# Patient Record
Sex: FEMALE | Race: WHITE | NOT HISPANIC OR LATINO | Employment: FULL TIME | URBAN - METROPOLITAN AREA
[De-identification: names, ages, dates, MRNs, and addresses within clinical notes are randomized per-mention and may not be internally consistent; named-entity substitution may affect disease eponyms.]

---

## 2021-01-29 ENCOUNTER — TRANSFERRED RECORDS (OUTPATIENT)
Dept: HEALTH INFORMATION MANAGEMENT | Facility: CLINIC | Age: 26
End: 2021-01-29

## 2021-02-11 ENCOUNTER — MEDICAL CORRESPONDENCE (OUTPATIENT)
Dept: HEALTH INFORMATION MANAGEMENT | Facility: CLINIC | Age: 26
End: 2021-02-11

## 2021-02-11 ENCOUNTER — TRANSCRIBE ORDERS (OUTPATIENT)
Dept: OTHER | Age: 26
End: 2021-02-11

## 2021-02-11 ENCOUNTER — TRANSFERRED RECORDS (OUTPATIENT)
Dept: HEALTH INFORMATION MANAGEMENT | Facility: CLINIC | Age: 26
End: 2021-02-11

## 2021-02-11 DIAGNOSIS — D68.59 LOW PROTEIN C ACTIVITY LEVEL (H): Primary | ICD-10-CM

## 2021-02-11 DIAGNOSIS — I26.99 PULMONARY EMBOLISM (H): ICD-10-CM

## 2021-03-07 ENCOUNTER — HEALTH MAINTENANCE LETTER (OUTPATIENT)
Age: 26
End: 2021-03-07

## 2021-04-07 ENCOUNTER — VIRTUAL VISIT (OUTPATIENT)
Dept: HEMATOLOGY | Facility: CLINIC | Age: 26
End: 2021-04-07
Attending: INTERNAL MEDICINE
Payer: COMMERCIAL

## 2021-04-07 VITALS — WEIGHT: 120 LBS

## 2021-04-07 DIAGNOSIS — E61.1 IRON DEFICIENCY: ICD-10-CM

## 2021-04-07 DIAGNOSIS — D68.59 PROTEIN C DEFICIENCY (H): Primary | ICD-10-CM

## 2021-04-07 PROBLEM — D50.8 IRON DEFICIENCY ANEMIA SECONDARY TO INADEQUATE DIETARY IRON INTAKE: Status: ACTIVE | Noted: 2020-12-29

## 2021-04-07 PROBLEM — Z86.711 HISTORY OF PULMONARY EMBOLUS (PE): Status: ACTIVE | Noted: 2020-12-30

## 2021-04-07 PROBLEM — I26.99 PE (PULMONARY THROMBOEMBOLISM) (H): Status: ACTIVE | Noted: 2020-11-28

## 2021-04-07 PROBLEM — Z30.430 ENCOUNTER FOR IUD INSERTION: Status: ACTIVE | Noted: 2021-01-06

## 2021-04-07 PROCEDURE — 99205 OFFICE O/P NEW HI 60 MIN: CPT | Mod: 95 | Performed by: INTERNAL MEDICINE

## 2021-04-07 RX ORDER — FERROUS SULFATE 325(65) MG
325 TABLET ORAL
COMMUNITY

## 2021-04-07 NOTE — PROGRESS NOTES
[facility]  [facility] [place] [address]  Phone: [phone], Fax: [phone]    Outpatient Visit Note:    Patient: Ada Shelby  MRN: [id number]  : 1995  USAMA: 2021    Reason for Consultation:  Provoked PE with probable protein C deficiency    Ada is a 26 year old who is being evaluated via a billable video visit.      How would you like to obtain your AVS? MyChart  If the video visit is dropped, the invitation should be resent by: Send to e-mail at: [email or web address]  Will anyone else be joining your video visit? No      Video Start Time: 10:46 AM    Video-Visit Details    Type of service:  Video Visit    Video End Time:11:34 AM    Originating Location (pt. Location): Home    Distant Location (provider location):  [facility]     Platform used for Video Visit: [email or web address]      Assessment:  In summary, Ada Shelby is a 26 year old woman with past medical history of iron deficiency anemia who presents in consultation due to recent provoked PE.     1. Provoked Pulmonary embolism. ddx 2020. Risk factor(s) 1). Estrogen containing birth control use 2). Probable protein C deficiency (level 66%)  Treatment: started on heparin and transferred to tertiary care. Cardiac U/S showed dilated R ventricle with septal bowing, EKG with S1Q3T3 pattern, R BBB, and TWI in inferior leads. Bilateral DVT U/S was negative. TPA was administered and she was admitted to the MICU on a heparin drip    2. Probable protein C deficiency. Value 66% (normal %) in 2021  3. Elevated Ddimer on anticoagulation: value 630 in 2021 on blood thinner  4. Non-anemia iron deficiency: ferritin 78%    Ada had a provoked thrombosis in setting of being on estrogen, however agree with collegues at [facility] that time (>1.5 y after starting) and severity of her episode warranted additional workup. Her evalutaion is  notable for negative antiphospholipid antibodies, but low protein C of 66% that was obtained in January 2021. This is well removed from her acute thrombosis. However, she was still on Xarelto. Xarelto is not likely to interfere with protein C, but would like to reassess off anticoagulation x1 before giving her this diagnosis. Would also like consult with Gloria Barrios in genetics.     Today, I also discussed with Ada that in female patients, a normal D-dimer off anticoagulation had a 5.4% (CI, 2.5% to 10.2%) risk of recurrent event (Abdifatah KWAN et al. 2015. Brianna Intern Med.162(1):27-3). I suspect her value may still be elevated as while on Xarelto she had level that was 630. However IF upon testing her protein C is normal and her D-dimer is normal she could come off blood thinners.     However, if protein C remains low AND she continues to have elevated D-dimer, I would recommend continuing blood thinner. In E[facility]EIN CHOICE, low dose rivaroxaban (10 mg once daily) was compared to aspirin 100 and to Rivaroxaban usual 20 mg dose. The efficacy was similar between usual and low dose Rivaroxaban, in both cases better than for aspirin. The risk of major bleeding was 0.4% in reduced; which was not significantly different to 0.3% in aspirin. Moreover, the bleeding risk of 20mg was only 0.5%. Ada is currently paying $500 out of pocket for Xarelto--- will address with our pharmacy to determine if payment programs or change to other medication may reduce her cost.     I did address with Ada that in the future, her personal history of blood clot and potential protein C deficiency will have implications for pregnancy and pregnancy planning. Would anticipate that she will need prophylaxis dose LMWH in the peripartum period and full dose anticoagulation post-partum. Would also likely need at least 1 maternal fetal medicine consult.     Last, Ada is ~950 mg deficient in iron (Ganzoni calculation). She is taking oral iron 1x times  a day. Recently, it was found that taking oral iron in divided daily doses increases serum hepcidin which suppresses oral iron absorption. With this in mind, iron-depleted woman with iron supplements daily as divided doses may not increase iron absorption (Dl POTTER et al. 2017. Lancet Haemat). Therefore, I would recommend dosing oral iron in a single dose every other day to optimize absorption. Furthermore, giving the extent of her iron deficiency and symptoms, the patient would benefit from proceeding to parenteral iron preparations. Next, I discussed use of ferric carboxymaltose (Injectafer) and ferumoxytol (Ferraheme) injections. These preparations allow for 500-750 mg of iron to be adminsitered over 10-15 m. The risk of anaphylaxis is decreasaed, so no need for premedications. In order to replete total body iron, patient would need to come to infusion center for 2 visits.      Plan:  1. Majority of today's visit was spent counseling the patient regarding provoked PE and potential Protein C Deficiency.  2. Patient to hold anticoagulation for 48 h then we will repeat test  3. If protein C still low and D-dimer elevated would recommend Xarelto 10 mg PO daily  4. Genetic counseling referral (Gloria Barrios)  5. Repeat iron studies  6. Consider IV iron in ferritin <50 : can be done while patient is on summer break from job    The patient is given our center's contact information and is instructed to call if she should have any further questions or concerns.  Otherwise, we will plan on seeing her back Follow up with Provider - 12 months or sooner .      Trupti Menendez, nurse clinician is present throughout the entire clinic visit with the patient today.  Patient understands and agrees with the above plan and recommendation.    Faiza Regan MD/PhD   of Medicine  Division of Hematology, Oncology and Transplantation    10:45  AM    ---------------------------------------------------------------------------------------------------------------------    History of Present Illness:  Ada Shelby is a 26 year old woman referred for recent pulmonary embolism.     Ada noticed ~2-2.5 weeks prior to hospital she noted decreased endurance and ability to breath while exercising decreased and the number of miles she could run decreased. Then started to noted increased SOB with going up stairs or talking long periods of time. Thought she had COVID. But continued to feel worse with headaches and more SOB while waiting for COVID test and then went to ER. [place] [facility] [place] for tPA given acute hypoxic respiratory failure and R heart strain. Cardiac U/S showed dilated R ventricle with septal bowing, EKG with S1Q3T3 pattern, R BBB, and TWI in inferior leads. Bilateral DVT U/S was negative. TPA was administered and she was admitted to the MICU on a heparin drip, there she was hemodynamically stable and transitioned to RA. She remained hemodynamically stable and was transitioned to rivaroxaban.. Denied any chest pain or SOB at rest, mild tachycardia on exertion. TTE on  showed EF of 60% without evidence of right heart strain and was otherwise unremarkable.     Ada is an avid runner- does a little bit of racing. In the month prior she was not doing any travel. Did take a longer car trip in August.  Started birth control pills in 2018. Stopped taking when she had blood clot. Got an IUD in January.     Ada did see a hematologist 4-5 years ago because of anemia and  . She started taking iron supplement    At present she is not having any issues with endurance. She did have one episode of rib pain in March and repeat testing. No leg swelling.     Past Medical History:  Active Ambulatory Problems     Diagnosis Date Noted     Iron deficiency 2021     Encounter for IUD insertion 2021     History of  pulmonary embolus (PE) 2020     Iron deficiency anemia secondary to inadequate dietary iron intake 2020     PE (pulmonary thromboembolism) (H) 2020     Resolved Ambulatory Problems     Diagnosis Date Noted     No Resolved Ambulatory Problems     Past Medical History:   Diagnosis Date     History of amenorrhea      Iron deficiency anemia      Past Surgical History:  No past surgical history on file.    Medications:  Current Outpatient Medications   Medication Sig Dispense Refill     ferrous sulfate (FEROSUL) 325 (65 Fe) MG tablet Take 325 mg by mouth       levonorgestrel (RITESH) 13.5 MG IUD 1 Intra Uterine Device by Intrauterine route       rivaroxaban ANTICOAGULANT (XARELTO ANTICOAGULANT) 20 MG TABS tablet Take 20 mg by mouth daily with food       Rivaroxaban ANTICOAGULANT 15 & 20 MG TBPK Take 15 mg by mouth twice daily with food for first 21 days, then on day 22 start taking 20 mg daily with food until told to stop by your provider.          Allergies:  No Known Allergies    ROS:  Denies any bleeding issues. No gum bleeding, No nose bleed. Denies any recent  hematuria or blood in stools. Denies any ecchymosis. Denies any lower extremities swelling or pain. Denies any fever, no chest pain. Denies any shortness of breath. Remainder of 14 point ROS is negative unless noted above    Social History:  No tobacco use. rare alcohol use. Denies any illicit drug use. Patient works as a  in [place], [place]. Grew in [place], [place]. Attended [place], [place].  and lives in [place], [place] with . Just got a kitten.     Family History:  1 younger sister  Mother- alive- had surgery for fibromuscular dysplasia 15 years ago. No history of miscarriage  Maternal grandfather  from suicide  Maternal grandmother: alive. In good health  Father- alive. No history of surgeries  Paternal aunt- unaware of miscarriages  Paternal uncle-  Paternal grandfather- . Passed away in hospital from  complications of pneumonia- potential cancer in his 80s  Paternal grandmother- alive    Objectives:  Wt 54.4 kg (120 lb)     GENERAL: Healthy, alert and no distress  EYES: Eyes grossly normal to inspection.  No discharge or erythema, or obvious scleral/conjunctival abnormalities.  RESP: No audible wheeze, cough, or visible cyanosis.  No visible retractions or increased work of breathing.    SKIN: Visible skin clear. No significant rash, abnormal pigmentation or lesions.  NEURO: Cranial nerves grossly intact.  Mentation and speech appropriate for age.  PSYCH: Mentation appears normal, affect normal/bright, judgement and insight intact, normal speech and appearance well-groomed.      Labs:  Reviewed in CareEverywhere  Negative genetic evaluation for factor V leiden, prothrombin mutaiton  Normal anticardiolipin and beta2 glycoprotein x2  Negative lupus anticoagulant x1  Normal AT3, protein S  Protein C 66%      Imaging:  Reviewed reports in CareEverywhere  Most recent CTA negative for residual thrombus

## 2021-04-07 NOTE — LETTER
2021      RE: Ada Shelby  [address] [address]       [facility]  [facility] [place] [address]  Phone: [phone], Fax: [phone]    Outpatient Visit Note:    Patient: Ada Shelby  MRN: [id number]  : 1995  USAMA: 2021    Reason for Consultation:  Provoked PE with probable protein C deficiency    Assessment:  In summary, Ada Shelby is a 26 year old woman with past medical history of iron deficiency anemia who presents in consultation due to recent provoked PE.     1. Provoked Pulmonary embolism. ddx 2020. Risk factor(s) 1). Estrogen containing birth control use 2). Probable protein C deficiency (level 66%)  Treatment: started on heparin and transferred to tertiary care. Cardiac U/S showed dilated R ventricle with septal bowing, EKG with S1Q3T3 pattern, R BBB, and TWI in inferior leads. Bilateral DVT U/S was negative. TPA was administered and she was admitted to the MICU on a heparin drip    2. Probable protein C deficiency. Value 66% (normal %) in 2021  3. Elevated Ddimer on anticoagulation: value 630 in 2021 on blood thinner  4. Non-anemia iron deficiency: ferritin 78%    Ada had a provoked thrombosis in setting of being on estrogen, however agree with collegues at [facility] that time (>1.5 y after starting) and severity of her episode warranted additional workup. Her evalutaion is notable for negative antiphospholipid antibodies, but low protein C of 66% that was obtained in 2021. This is well removed from her acute thrombosis. However, she was still on Xarelto. Xarelto is not likely to interfere with protein C, but would like to reassess off anticoagulation x1 before giving her this diagnosis. Would also like consult with Gloria Barrios in genetics.     Today, I also discussed with Ada that in female patients, a normal D-dimer off anticoagulation had a 5.4% (CI, 2.5% to 10.2%) risk of  recurrent event (Abdifatah KWAN et al. 2015. Brianna Intern Med.162(1):27-3). I suspect her value may still be elevated as while on Xarelto she had level that was 630. However IF upon testing her protein C is normal and her D-dimer is normal she could come off blood thinners.     However, if protein C remains low AND she continues to have elevated D-dimer, I would recommend continuing blood thinner. In EINSTEIN CHOICE, low dose rivaroxaban (10 mg once daily) was compared to aspirin 100 and to Rivaroxaban usual 20 mg dose. The efficacy was similar between usual and low dose Rivaroxaban, in both cases better than for aspirin. The risk of major bleeding was 0.4% in reduced; which was not significantly different to 0.3% in aspirin. Moreover, the bleeding risk of 20mg was only 0.5%. Ada is currently paying $500 out of pocket for Xarelto--- will address with our pharmacy to determine if payment programs or change to other medication may reduce her cost.     I did address with Ada that in the future, her personal history of blood clot and potential protein C deficiency will have implications for pregnancy and pregnancy planning. Would anticipate that she will need prophylaxis dose LMWH in the peripartum period and full dose anticoagulation post-partum. Would also likely need at least 1 maternal fetal medicine consult.     Last, Ada is ~950 mg deficient in iron (Ganzoni calculation). She is taking oral iron 1x times a day. Recently, it was found that taking oral iron in divided daily doses increases serum hepcidin which suppresses oral iron absorption. With this in mind, iron-depleted woman with iron supplements daily as divided doses may not increase iron absorption (Dl POTTER et al. 2017. Lancet Haemat). Therefore, I would recommend dosing oral iron in a single dose every other day to optimize absorption. Furthermore, giving the extent of her iron deficiency and symptoms, the patient would benefit from proceeding to  parenteral iron preparations. Next, I discussed use of ferric carboxymaltose (Injectafer) and ferumoxytol (Ferraheme) injections. These preparations allow for 500-750 mg of iron to be adminsitered over 10-15 m. The risk of anaphylaxis is decreasaed, so no need for premedications. In order to replete total body iron, patient would need to come to infusion center for 2 visits.      Plan:  1. Majority of today's visit was spent counseling the patient regarding provoked PE and potential Protein C Deficiency.  2. Patient to hold anticoagulation for 48 h then we will repeat test  3. If protein C still low and D-dimer elevated would recommend Xarelto 10 mg PO daily  4. Genetic counseling referral (Gloria Barrios)  5. Repeat iron studies  6. Consider IV iron in ferritin <50 : can be done while patient is on summer break from job    The patient is given our center's contact information and is instructed to call if she should have any further questions or concerns.  Otherwise, we will plan on seeing her back Follow up with Provider - 12 months or sooner .      Trupti Menendez, nurse clinician is present throughout the entire clinic visit with the patient today.  Patient understands and agrees with the above plan and recommendation.    Faiza Regan MD/PhD   of Medicine  Division of Hematology, Oncology and Transplantation    10:45 AM    ---------------------------------------------------------------------------------------------------------------------    History of Present Illness:  Ada Shelby is a 26 year old woman referred for recent pulmonary embolism.     Ada noticed ~2-2.5 weeks prior to hospital she noted decreased endurance and ability to breath while exercising decreased and the number of miles she could run decreased. Then started to noted increased SOB with going up stairs or talking long periods of time. Thought she had COVID. But continued to feel worse with headaches and more SOB while  waiting for COVID test and then went to ER. She was transferred to [facility] [place] a heparin drip for tPA given acute hypoxic respiratory failure and R heart strain. Cardiac U/S showed dilated R ventricle with septal bowing, EKG with S1Q3T3 pattern, R BBB, and TWI in inferior leads. Bilateral DVT U/S was negative. TPA was administered and she was admitted to the MICU on a heparin drip, there she was hemodynamically stable and transitioned to RA. She remained hemodynamically stable and was transitioned to rivaroxaban.. Denied any chest pain or SOB at rest, mild tachycardia on exertion. TTE on  showed EF of 60% without evidence of right heart strain and was otherwise unremarkable.     Ada is an avid runner- does a little bit of racing. In the month prior she was not doing any travel. Did take a longer car trip in August.  Started birth control pills in 2018. Stopped taking when she had blood clot. Got an IUD in January.     Ada did see a hematologist 4-5 years ago because of anemia and  . She started taking iron supplement    At present she is not having any issues with endurance. She did have one episode of rib pain in March and repeat testing. No leg swelling.     Past Medical History:  Active Ambulatory Problems     Diagnosis Date Noted     Iron deficiency 2021     Encounter for IUD insertion 2021     History of pulmonary embolus (PE) 2020     Iron deficiency anemia secondary to inadequate dietary iron intake 2020     PE (pulmonary thromboembolism) (H) 2020     Resolved Ambulatory Problems     Diagnosis Date Noted     No Resolved Ambulatory Problems     Past Medical History:   Diagnosis Date     History of amenorrhea      Iron deficiency anemia      Past Surgical History:  No past surgical history on file.    Medications:  Current Outpatient Medications   Medication Sig Dispense Refill     ferrous sulfate (FEROSUL) 325 (65 Fe) MG tablet Take 325 mg by mouth        levonorgestrel (RITESH) 13.5 MG IUD 1 Intra Uterine Device by Intrauterine route       rivaroxaban ANTICOAGULANT (XARELTO ANTICOAGULANT) 20 MG TABS tablet Take 20 mg by mouth daily with food       Rivaroxaban ANTICOAGULANT 15 & 20 MG TBPK Take 15 mg by mouth twice daily with food for first 21 days, then on day 22 start taking 20 mg daily with food until told to stop by your provider.          Allergies:  No Known Allergies    ROS:  Denies any bleeding issues. No gum bleeding, No nose bleed. Denies any recent  hematuria or blood in stools. Denies any ecchymosis. Denies any lower extremities swelling or pain. Denies any fever, no chest pain. Denies any shortness of breath. Remainder of 14 point ROS is negative unless noted above    Social History:  No tobacco use. rare alcohol use. Denies any illicit drug use. Patient works as a  in [place], [place]. Grew in [place], [place]. Attended [place], [place].  and lives in [place], [place] with . Just got a kitten.     Family History:  1 younger sister  Mother- alive- had surgery for fibromuscular dysplasia 15 years ago. No history of miscarriage  Maternal grandfather  from suicide  Maternal grandmother: alive. In good health  Father- alive. No history of surgeries  Paternal aunt- unaware of miscarriages  Paternal uncle-  Paternal grandfather- . Passed away in hospital from complications of pneumonia- potential cancer in his 80s  Paternal grandmother- alive    Objectives:  Wt 54.4 kg (120 lb)     GENERAL: Healthy, alert and no distress  EYES: Eyes grossly normal to inspection.  No discharge or erythema, or obvious scleral/conjunctival abnormalities.  RESP: No audible wheeze, cough, or visible cyanosis.  No visible retractions or increased work of breathing.    SKIN: Visible skin clear. No significant rash, abnormal pigmentation or lesions.  NEURO: Cranial nerves grossly intact.  Mentation and speech appropriate for age.  PSYCH: Mentation appears  normal, affect normal/bright, judgement and insight intact, normal speech and appearance well-groomed.      Labs:  Reviewed in CareEverywhere  Negative genetic evaluation for factor V leiden, prothrombin mutaiton  Normal anticardiolipin and beta2 glycoprotein x2  Negative lupus anticoagulant x1  Normal AT3, protein S  Protein C 66%      Imaging:  Reviewed reports in CareEverywhere  Most recent CTA negative for residual thrombus        Faiza Regan MD

## 2021-04-07 NOTE — PATIENT INSTRUCTIONS
[facility]  [facility]  [facility], [place] [address]  Main: [phone], Fax: [phone]    It was a pleasure seeing you today.  Thank you for allowing us to be involved in your care.  Please let us know if there is anything else we can do for you, so that we can be sure you are leaving completely satisfied with your care experience.    Labs at date to be determined. I would like to repeat some labs when you are off your blood thinner for 5 days. We will contact you regarding timing and location of this.   We will communicate these to you by [email or web address]. With your permission we may leave a detailed voicemail, please see below for our contact information should you have any questions about your results. Also, please note that some lab results may take a week or so to get back. Feel free to call or message if you have not heard back from us within 7-10 days.    Please stay on your blood thinner:  Xarelto 20 mg. We will have your hold for 5 days before we repeat labs. Then, based on labs may reduce dose to 10 mg.  Please call us with any bleeding issues that you may have.    Iron infusion: based on labs we may recommend an iron infusion. This can wait until school year is over. We will also clear with your insurance prior.     Patient Education & Resources:  For additional information, please see the following web links:  www.stoptheclot.org, www.clotconnect.org.    Call the [facility]  at [phone].     -If surgeries or procedures are planned (for holding instructions).     -If off anticoagulation, please call during high risk times (long-distance travel, broken bones or trauma, immobilization, surgery, pregnancy, or taking estrogen).     -Any new symptoms of DVT (deep vein thrombosis) or PE (pulmonary embolism)    -pain     -swelling     -redness    -warmth    -shortness of breath    -chest  pain    -coughing up blood    We would like a provider on our team to see you at least annually for optimal care and to allow us to continue to prescribe for you.  Ariel Box PA-C and Oriana Palencia PA-C are our physician assistants that are specialized in bleeding and clotting disorders that you may be able to see more readily.    Return to clinic in  12 months.  Please schedule return appointment with Dr. Regan.    Your nurse clinician is Trupti Menendez -[phone] .   If they are unavailable and you have immediate concerns, please call [phone] and ask for a nurse.

## 2021-04-07 NOTE — PROGRESS NOTES
Patient was contacted to complete the pre-visit call prior to their video visit with the provider.      Allergies and medications were reviewed.    I thanked them for their time to cover this information     Dion Combs CMA

## 2021-04-15 ENCOUNTER — TELEPHONE (OUTPATIENT)
Dept: HEMATOLOGY | Facility: CLINIC | Age: 26
End: 2021-04-15

## 2021-05-10 ENCOUNTER — VIRTUAL VISIT (OUTPATIENT)
Dept: HEMATOLOGY | Facility: CLINIC | Age: 26
End: 2021-05-10
Attending: GENETIC COUNSELOR, MS
Payer: COMMERCIAL

## 2021-05-10 DIAGNOSIS — D68.59 PROTEIN C DEFICIENCY (H): Primary | ICD-10-CM

## 2021-05-10 DIAGNOSIS — I26.99 PE (PULMONARY THROMBOEMBOLISM) (H): ICD-10-CM

## 2021-05-10 NOTE — PROGRESS NOTES
"2021    Presenting Information: Ada Shelby was seen for genetic counseling through [facility] today. Today's visit was conducted by telephone due to concerns regarding the ongoing COVID-19 pandemic.  I met with her per the request of Dr. Faiza Regan to obtain a family history and to discuss the genetics of Protein C deficiency.     Personal History:   Briefly, Ada is a 26 year old woman with probable protein C deficiency. She had a provoked pulmonary embolism in 2020 while on estrogen.  Ada had a low protein C level at 66%  (normal %) in 2021. Her Protein S and antithrombin levels were within the normal range. She is negative for the factor V Leiden and prothrombin gene mutations. Please see Dr. Regan's note for additional details regarding her personal history.     Family History: A three generation pedigree, specific to clotting was obtained today and scanned into the EMR. The following information is significant:     Sister, age 23, has not had a blood clot. She does not have children. She has not been tested for protein C deficiency and is not taking oral birth control.      Mother, age 57, has \"narrowing of the arteries\" and has  adiagnosis of fibromuscular dysplasia. Ada is unsure of the details of this diagnosis, and is unsure if her mother has had a blood clot. Her mother is a non-smoker.    Maternal grandmother had breast cancer around age 68, and is currently 78.    Father is 59 and has not had a blood clot.     Paternal aunt  of something stomach-related. Ada does not believe this was cancer-related, and is unsure of her exact diagnosis. She had not had a blood clot.     The family history is otherwise negative for clotting.    Discussion:     Dr. Regan has recommended genetic testing for Ada to help clarify if she has hereditary Protein C deficiency, given that she has had a venous thromboembolism and a low protein C " level.  Genetic testing would help to provide molecular confirmation of this suspected diagnosis.     Genetic testing may help to determine if the low Protein C levels are due to a mutation in the PROC gene.  We discussed that genetic testing identifies a mutation in the majority of people with hereditary protein C deficiency.    We reviewed autosomal dominant inheritance.  All children (both males and females) of a parent with genetic Protein C deficiency have a 50% chance of inheriting it.  Those who do not inherit the gene mutation cannot pass it on to their children (i.e., it cannot skip generations).  We discussed that in rare cases, children can inherit a PROC mutation from both parents, which can cause severe thrombosis and purpura fulminans in early childhood. We discussed that some couples with hereditary Protein C deficiency choose to have the other partner tested if one partner has protein C deficiency to determine risk for future children.     We discussed the three possible results we can receive with genetic testing, including positive, negative, and variant of uncertain significance.  Benefits and limitations of testing were discussed.  The Genetic Information Non-Discrimination Act was discussed.  Ada  verbalized understanding of this information.      Ada expressed interested in moving forward with genetic testing.  Verbal consent for PROC sequencing and deletion/duplication analysis was obtained due to the COVID-19 pandemic. Thus, her blood will be drawn and sent to the [facility] ([facility]).  We discussed that we will start with a prior authorization, which will take about 2-4 weeks. She will hear from the lab's  regarding insurance approval. She will have her blood drawn this Friday when she is having other labs drawn.      Plan:   1. A three generation pedigree was obtained and scanned into the EMR.  2. Consent was obtained for PROC genetic  testing at [facility]. She should hear from the lab regarding insurance coverage for this test in about 2-4 weeks. Testing takes about 4 weeks to complete after authorization is approved.     Approximate Time Spent in Consultation by Phone: 30 minutes.     Gloria Barrios MS, [facility]  Licensed, Certified Genetic Counselor  P. [phone]  F. [phone]    CC: No Letter

## 2021-05-14 DIAGNOSIS — D68.59 PROTEIN C DEFICIENCY (H): ICD-10-CM

## 2021-05-14 DIAGNOSIS — E61.1 IRON DEFICIENCY: ICD-10-CM

## 2021-05-14 DIAGNOSIS — I26.99 PE (PULMONARY THROMBOEMBOLISM) (H): ICD-10-CM

## 2021-05-14 LAB
ALBUMIN SERPL-MCNC: 4 G/DL (ref 3.4–5)
ALP SERPL-CCNC: 80 U/L (ref 40–150)
ALT SERPL W P-5'-P-CCNC: 38 U/L (ref 0–50)
ANION GAP SERPL CALCULATED.3IONS-SCNC: 4 MMOL/L (ref 3–14)
AST SERPL W P-5'-P-CCNC: 27 U/L (ref 0–45)
BASOPHILS # BLD AUTO: 0.1 10E9/L (ref 0–0.2)
BASOPHILS NFR BLD AUTO: 1.3 %
BILIRUB SERPL-MCNC: 0.4 MG/DL (ref 0.2–1.3)
BUN SERPL-MCNC: 17 MG/DL (ref 7–30)
CALCIUM SERPL-MCNC: 8.8 MG/DL (ref 8.5–10.1)
CHLORIDE SERPL-SCNC: 108 MMOL/L (ref 94–109)
CO2 SERPL-SCNC: 27 MMOL/L (ref 20–32)
CREAT SERPL-MCNC: 0.78 MG/DL (ref 0.52–1.04)
D DIMER PPP FEU-MCNC: <0.3 UG/ML FEU (ref 0–0.5)
DIFFERENTIAL METHOD BLD: ABNORMAL
EOSINOPHIL # BLD AUTO: 0.1 10E9/L (ref 0–0.7)
EOSINOPHIL NFR BLD AUTO: 1.3 %
ERYTHROCYTE [DISTWIDTH] IN BLOOD BY AUTOMATED COUNT: 16 % (ref 10–15)
ERYTHROCYTE [SEDIMENTATION RATE] IN BLOOD BY WESTERGREN METHOD: 4 MM/H (ref 0–20)
FERRITIN SERPL-MCNC: 3 NG/ML (ref 12–150)
GFR SERPL CREATININE-BSD FRML MDRD: >90 ML/MIN/{1.73_M2}
GLUCOSE SERPL-MCNC: 93 MG/DL (ref 70–99)
HCT VFR BLD AUTO: 38.6 % (ref 35–47)
HGB BLD-MCNC: 11.6 G/DL (ref 11.7–15.7)
IMM GRANULOCYTES # BLD: 0 10E9/L (ref 0–0.4)
IMM GRANULOCYTES NFR BLD: 0.2 %
IRON SATN MFR SERPL: 4 % (ref 15–46)
IRON SERPL-MCNC: 20 UG/DL (ref 35–180)
LYMPHOCYTES # BLD AUTO: 2.1 10E9/L (ref 0.8–5.3)
LYMPHOCYTES NFR BLD AUTO: 44.8 %
MCH RBC QN AUTO: 24.5 PG (ref 26.5–33)
MCHC RBC AUTO-ENTMCNC: 30.1 G/DL (ref 31.5–36.5)
MCV RBC AUTO: 82 FL (ref 78–100)
MONOCYTES # BLD AUTO: 0.5 10E9/L (ref 0–1.3)
MONOCYTES NFR BLD AUTO: 10.4 %
NEUTROPHILS # BLD AUTO: 1.9 10E9/L (ref 1.6–8.3)
NEUTROPHILS NFR BLD AUTO: 42 %
PLATELET # BLD AUTO: 439 10E9/L (ref 150–450)
POTASSIUM SERPL-SCNC: 3.9 MMOL/L (ref 3.4–5.3)
PROT SERPL-MCNC: 7.8 G/DL (ref 6.8–8.8)
RBC # BLD AUTO: 4.73 10E12/L (ref 3.8–5.2)
RETICS # AUTO: 65.7 10E9/L (ref 25–95)
RETICS/RBC NFR AUTO: 1.4 % (ref 0.5–2)
SODIUM SERPL-SCNC: 139 MMOL/L (ref 133–144)
TIBC SERPL-MCNC: 515 UG/DL (ref 240–430)
WBC # BLD AUTO: 4.6 10E9/L (ref 4–11)

## 2021-05-14 PROCEDURE — 99N1104 PR STATISTIC DNA ISOL HIGH PURITY: Performed by: INTERNAL MEDICINE

## 2021-05-14 PROCEDURE — 85045 AUTOMATED RETICULOCYTE COUNT: CPT | Performed by: INTERNAL MEDICINE

## 2021-05-14 PROCEDURE — 85303 CLOT INHIBIT PROT C ACTIVITY: CPT | Performed by: INTERNAL MEDICINE

## 2021-05-14 PROCEDURE — 82728 ASSAY OF FERRITIN: CPT | Performed by: INTERNAL MEDICINE

## 2021-05-14 PROCEDURE — 85652 RBC SED RATE AUTOMATED: CPT | Performed by: INTERNAL MEDICINE

## 2021-05-14 PROCEDURE — 80053 COMPREHEN METABOLIC PANEL: CPT | Performed by: INTERNAL MEDICINE

## 2021-05-14 PROCEDURE — 85025 COMPLETE CBC W/AUTO DIFF WBC: CPT | Performed by: INTERNAL MEDICINE

## 2021-05-14 PROCEDURE — 83540 ASSAY OF IRON: CPT | Performed by: INTERNAL MEDICINE

## 2021-05-14 PROCEDURE — 36415 COLL VENOUS BLD VENIPUNCTURE: CPT | Performed by: INTERNAL MEDICINE

## 2021-05-14 PROCEDURE — 85379 FIBRIN DEGRADATION QUANT: CPT | Performed by: INTERNAL MEDICINE

## 2021-05-14 PROCEDURE — 83550 IRON BINDING TEST: CPT | Performed by: INTERNAL MEDICINE

## 2021-05-17 LAB — PROT C ACT/NOR PPP CHRO: 54 % (ref 70–170)

## 2021-05-19 ENCOUNTER — TELEPHONE (OUTPATIENT)
Dept: CONSULT | Facility: CLINIC | Age: 26
End: 2021-05-19

## 2021-05-19 LAB — COPATH REPORT: NORMAL

## 2021-05-19 NOTE — TELEPHONE ENCOUNTER
I called 5/19/2021 to update Ada on insurance coverage for her genetic testing ([place] approval). However, I was unable to reach Ada.  I left a non-detailed voicemail with my name and phone number.    ANNITA Jean-Baptiste  Genomics Billing    [facility]   [facility] Diagn[facility] Laboratory  [address] [place] [id number]  [phone]

## 2021-05-25 NOTE — TELEPHONE ENCOUNTER
Notified Ada that we received prior authorization approval for genetic testing. Valid from 5/13/2021 to 7/11/2021. Authorization number is [id number].     Explained that insurance benefits may still apply, therefore, there could be an out of pocket cost.     Ada expressed understanding and stated that she wants to proceed with testing. We will call when results are available. Ada had no further questions.    ANNITA Jean-Baptiste  Genomics Billing    [facility]   Molecular Diagnostics Laboratory  [address] [place] [id number] [phone]

## 2021-06-02 DIAGNOSIS — D68.59 PROTEIN C DEFICIENCY (H): Primary | ICD-10-CM

## 2021-06-02 DIAGNOSIS — I26.99 PE (PULMONARY THROMBOEMBOLISM) (H): ICD-10-CM

## 2021-06-02 PROCEDURE — G0452 MOLECULAR PATHOLOGY INTERPR: HCPCS | Mod: 26 | Performed by: PATHOLOGY

## 2021-06-02 PROCEDURE — 81479 UNLISTED MOLECULAR PATHOLOGY: CPT | Performed by: INTERNAL MEDICINE

## 2021-06-08 DIAGNOSIS — D68.59 PROTEIN C DEFICIENCY (H): ICD-10-CM

## 2021-06-09 LAB — COPATH REPORT: NORMAL

## 2021-06-10 ENCOUNTER — TELEPHONE (OUTPATIENT)
Dept: HEMATOLOGY | Facility: CLINIC | Age: 26
End: 2021-06-10

## 2021-06-10 NOTE — TELEPHONE ENCOUNTER
6/10/2021    I called Ada today to discuss her genetic test results, but was unable to reach her.  I left a non-detailed voicemail with my name and phone number.    Gloria Barrios MS, [facility]  Licensed, Certified Genetic Counselor  P. [phone]  F. [phone]

## 2021-06-11 DIAGNOSIS — D68.59 PROTEIN C DEFICIENCY (H): Primary | ICD-10-CM

## 2021-06-11 NOTE — TELEPHONE ENCOUNTER
"6/11/2021    Referring Provider: Dr. Faiza Regan    Presenting Information:  I spoke to Ada by phone today to discuss her genetic testing results.  The PROC sequencing and copy number variation analysis test was ordered from the [facility]. This testing was done because of Ada's low protein C level and history of pulmonary embolism.    Genetic Testing Result: NEGATIVE   Ada is negative for mutations in the PROC gene. Harmful mutations in this gene cause protein C deficiency.  This test included sequencing and deletion/duplication analysis.     A copy of the test report can be found in the Laboratory tab, dated 6/2/2021, and named \"Next generation sequencing\".    Interpretation:  We discussed different interpretations of this negative test result.    1. One explanation is that Ada truly does not have Protein C deficiency, and therefore her genetic testing was negative.   2. Another explanation may be that she does have protein C deficiency, but has a mutation in the PROC gene that could not be identified with our current testing methods.      Because her diagnosis remains unclear, Dr. Regan has recommended repeat testing of Ada's protein C level. This can be coordinated when Ada is coming in for her next iron infusion, and I have made her nurse Trupti Shon aware that Ada is agreeable to this plan.     We discussed that, based on future testing, if Dr. Regan does diagnose Ada with Protein C deficiency, then her relatives (particularly her parents and sister) can undergo protein C level testing under care of a physician familiar with protein C deficiency. If she does have hereditary protein C deficiency, then there would be a 50% chance to pass on this condition to a child. We may meet again to review genetics concepts in further detail if she is diagnosed with Protein C deficiency, and Ada is welcome to reach out to me with questions at any " time.    Summary:  We do not have an explanation for her history of pulmonary embolism and low protein C level at this time.  Because of that, it is important for Ada to follow Dr. Regan recommendations for followup related to her clinical history.    Plan:  1. [facility] These were reviewed today, as outlined above.   2. She will have her protein C level re-checked in the near future     If Ada has any further questions, she is encouraged to contact me at [phone].    Gloria Barrios MS, [facility]  Licensed, Certified Genetic Counselor  P. [phone]  F. [phone]

## 2021-06-14 DIAGNOSIS — D50.8 IRON DEFICIENCY ANEMIA SECONDARY TO INADEQUATE DIETARY IRON INTAKE: Primary | ICD-10-CM

## 2021-06-14 RX ORDER — MEPERIDINE HYDROCHLORIDE 25 MG/ML
25 INJECTION INTRAMUSCULAR; INTRAVENOUS; SUBCUTANEOUS EVERY 30 MIN PRN
Status: CANCELLED | OUTPATIENT
Start: 2021-06-14

## 2021-06-14 RX ORDER — METHYLPREDNISOLONE SODIUM SUCCINATE 125 MG/2ML
125 INJECTION, POWDER, LYOPHILIZED, FOR SOLUTION INTRAMUSCULAR; INTRAVENOUS
Status: CANCELLED
Start: 2021-06-14

## 2021-06-14 RX ORDER — NALOXONE HYDROCHLORIDE 0.4 MG/ML
0.2 INJECTION, SOLUTION INTRAMUSCULAR; INTRAVENOUS; SUBCUTANEOUS
Status: CANCELLED | OUTPATIENT
Start: 2021-06-14

## 2021-06-14 RX ORDER — EPINEPHRINE 1 MG/ML
0.3 INJECTION, SOLUTION INTRAMUSCULAR; SUBCUTANEOUS EVERY 5 MIN PRN
Status: CANCELLED | OUTPATIENT
Start: 2021-06-14

## 2021-06-14 RX ORDER — DIPHENHYDRAMINE HYDROCHLORIDE 50 MG/ML
50 INJECTION INTRAMUSCULAR; INTRAVENOUS
Status: CANCELLED
Start: 2021-06-14

## 2021-06-14 RX ORDER — ALBUTEROL SULFATE 5 MG/ML
2.5 SOLUTION RESPIRATORY (INHALATION)
Status: CANCELLED | OUTPATIENT
Start: 2021-06-14

## 2021-06-14 RX ORDER — ALBUTEROL SULFATE 90 UG/1
1-2 AEROSOL, METERED RESPIRATORY (INHALATION)
Status: CANCELLED
Start: 2021-06-14

## 2021-06-17 ENCOUNTER — TELEPHONE (OUTPATIENT)
Dept: INFUSION THERAPY | Facility: CLINIC | Age: 26
End: 2021-06-17

## 2021-06-17 NOTE — TELEPHONE ENCOUNTER
I called and LVM for Ada. [facility] changed her infusion orders. We need to re-schedule her Injectafer to Infed per Pharmacy and charge nurse.     Bella

## 2021-06-18 ENCOUNTER — TELEPHONE (OUTPATIENT)
Dept: INFUSION THERAPY | Facility: CLINIC | Age: 26
End: 2021-06-18

## 2021-06-18 NOTE — TELEPHONE ENCOUNTER
We need to re-schedule her upcoming infusion appointments due to order change. Injectafer to Infed. JOHANN Blanco

## 2021-07-12 ENCOUNTER — INFUSION THERAPY VISIT (OUTPATIENT)
Dept: INFUSION THERAPY | Facility: CLINIC | Age: 26
End: 2021-07-12
Attending: INTERNAL MEDICINE
Payer: COMMERCIAL

## 2021-07-12 VITALS
SYSTOLIC BLOOD PRESSURE: 114 MMHG | RESPIRATION RATE: 16 BRPM | OXYGEN SATURATION: 98 % | TEMPERATURE: 98.6 F | HEART RATE: 74 BPM | DIASTOLIC BLOOD PRESSURE: 73 MMHG | WEIGHT: 118 LBS

## 2021-07-12 DIAGNOSIS — D68.59 PROTEIN C DEFICIENCY (H): ICD-10-CM

## 2021-07-12 DIAGNOSIS — D50.8 IRON DEFICIENCY ANEMIA SECONDARY TO INADEQUATE DIETARY IRON INTAKE: Primary | ICD-10-CM

## 2021-07-12 PROCEDURE — 96366 THER/PROPH/DIAG IV INF ADDON: CPT | Performed by: NURSE PRACTITIONER

## 2021-07-12 PROCEDURE — 96365 THER/PROPH/DIAG IV INF INIT: CPT | Performed by: NURSE PRACTITIONER

## 2021-07-12 PROCEDURE — 99207 PR NO CHARGE LOS: CPT

## 2021-07-12 PROCEDURE — 85303 CLOT INHIBIT PROT C ACTIVITY: CPT | Performed by: NURSE PRACTITIONER

## 2021-07-12 RX ORDER — EPINEPHRINE 1 MG/ML
0.3 INJECTION, SOLUTION INTRAMUSCULAR; SUBCUTANEOUS EVERY 5 MIN PRN
Status: CANCELLED | OUTPATIENT
Start: 2021-07-12

## 2021-07-12 RX ORDER — METHYLPREDNISOLONE SODIUM SUCCINATE 125 MG/2ML
125 INJECTION, POWDER, LYOPHILIZED, FOR SOLUTION INTRAMUSCULAR; INTRAVENOUS
Status: CANCELLED
Start: 2021-07-12

## 2021-07-12 RX ORDER — NALOXONE HYDROCHLORIDE 0.4 MG/ML
0.2 INJECTION, SOLUTION INTRAMUSCULAR; INTRAVENOUS; SUBCUTANEOUS
Status: CANCELLED | OUTPATIENT
Start: 2021-07-12

## 2021-07-12 RX ORDER — ALBUTEROL SULFATE 0.83 MG/ML
2.5 SOLUTION RESPIRATORY (INHALATION)
Status: CANCELLED | OUTPATIENT
Start: 2021-07-12

## 2021-07-12 RX ORDER — DIPHENHYDRAMINE HYDROCHLORIDE 50 MG/ML
50 INJECTION INTRAMUSCULAR; INTRAVENOUS
Status: CANCELLED
Start: 2021-07-12

## 2021-07-12 RX ORDER — ALBUTEROL SULFATE 90 UG/1
1-2 AEROSOL, METERED RESPIRATORY (INHALATION)
Status: CANCELLED
Start: 2021-07-12

## 2021-07-12 RX ORDER — MEPERIDINE HYDROCHLORIDE 25 MG/ML
25 INJECTION INTRAMUSCULAR; INTRAVENOUS; SUBCUTANEOUS EVERY 30 MIN PRN
Status: CANCELLED | OUTPATIENT
Start: 2021-07-12

## 2021-07-12 RX ADMIN — Medication 250 ML: at 13:03

## 2021-07-12 NOTE — PROGRESS NOTES
Infusion Nursing Note:  Ada Shelby presents today for New Infed.    Patient seen by provider today: No   present during visit today: Not Applicable.    Note: Patient oriented to infusion center and routines. Questions answered regarding infed and potential side effects.  Patient verbalized understanding, and OK to proceed as planned.     Intravenous Access:  Peripheral IV placed.    Treatment Conditions:  Not Applicable.      Post Infusion Assessment:  Patient tolerated infusion without incident.  Patient observed for 60 minutes post Infed test dose per protocol.  Blood return noted pre and post infusion.  Site patent and intact, free from redness, edema or discomfort.  No evidence of extravasations.  Access discontinued per protocol.       Discharge Plan:   Discharge instructions reviewed with: Patient.  Patient and/or family verbalized understanding of discharge instructions and all questions answered.  Patient discharged in stable condition accompanied by: self.  Departure Mode: Ambulatory.      Amy Lee RN

## 2021-07-14 LAB — PROT C ACT/NOR PPP CHRO: 52 % (ref 70–170)

## 2021-08-20 DIAGNOSIS — D68.59 PROTEIN C DEFICIENCY (H): ICD-10-CM

## 2021-10-11 ENCOUNTER — HEALTH MAINTENANCE LETTER (OUTPATIENT)
Age: 26
End: 2021-10-11

## 2022-03-23 ENCOUNTER — OFFICE VISIT (OUTPATIENT)
Dept: HEMATOLOGY | Facility: CLINIC | Age: 27
End: 2022-03-23
Attending: INTERNAL MEDICINE
Payer: COMMERCIAL

## 2022-03-23 VITALS
BODY MASS INDEX: 19.26 KG/M2 | HEIGHT: 67 IN | DIASTOLIC BLOOD PRESSURE: 82 MMHG | OXYGEN SATURATION: 100 % | HEART RATE: 56 BPM | TEMPERATURE: 98.2 F | SYSTOLIC BLOOD PRESSURE: 148 MMHG | WEIGHT: 122.7 LBS | RESPIRATION RATE: 16 BRPM

## 2022-03-23 DIAGNOSIS — Z79.01 CHRONIC ANTICOAGULATION: Primary | ICD-10-CM

## 2022-03-23 DIAGNOSIS — D68.59 PROTEIN C DEFICIENCY (H): ICD-10-CM

## 2022-03-23 PROCEDURE — 99214 OFFICE O/P EST MOD 30 MIN: CPT | Performed by: INTERNAL MEDICINE

## 2022-03-23 PROCEDURE — G0463 HOSPITAL OUTPT CLINIC VISIT: HCPCS

## 2022-03-23 ASSESSMENT — PAIN SCALES - GENERAL: PAINLEVEL: NO PAIN (0)

## 2022-03-23 NOTE — PATIENT INSTRUCTIONS
[facility]  [facility]  [facility] [address], [place] [address]  Main: [phone], Fax: [phone]    It was a pleasure seeing you today.  Thank you for allowing us to be involved in your care.  Please let us know if there is anything else we can do for you, so that we can be sure you are leaving completely satisfied with your care experience.    Labs- recommend labs in May or June.      For any upcoming procedure/surgery, please hold Xarelto for 24 hours prior to your procedure and restart it 12-24 hours after with surgeon approval.    Please stay on your blood thinner:  Xarelto  Please call us with any bleeding issues that you may have.    Wear compression stockings if you have swelling in your leg. Take them off while you are sleeping. You may need to get new stockings every 3-6 months with regular wear.    Patient Education & Resources:  For additional information, please see the following web links:  www.stoptheclot.org, www.clotconnect.org.    Call the [facility]  at [phone].     -If surgeries or procedures are planned (for holding instructions).     -If off anticoagulation, please call during high risk times (long-distance travel, broken bones or trauma, immobilization, surgery, pregnancy, or taking estrogen).     -Any new symptoms of DVT (deep vein thrombosis) or PE (pulmonary embolism)    -pain     -swelling     -redness    -warmth    -shortness of breath    -chest pain    -coughing up blood    We would like a provider on our team to see you at least annually for optimal care and to allow us to continue to prescribe for you.  Ariel Box PA-C and Oriana Palencia PA-C are our physician assistants that are specialized in bleeding and clotting disorders that you may be able to see more readily.    Return to clinic in  12 months.  Please schedule return appointment with Dr. Regan.    Your nurse clinician  is Amanda Melgar -[phone].   If they are unavailable and you have immediate concerns, please call [phone] and ask for a nurse.

## 2022-03-23 NOTE — PROGRESS NOTES
[facility]  [facility] [place] [address]  Phone: [phone], Fax: [phone]    Outpatient Visit Note:    Patient: Ada Shelby  MRN: [id number]  : 1995  [place]AMA: Mar 23, 2022    Reason for Consultation:  Provoked PE with probable protein C deficiency    Assessment:  In summary, Ada Shelby is a 26 year old woman with past medical history of iron deficiency anemia who presents in consultation due to recent provoked PE.     1. Provoked Pulmonary embolism. ddx 2020. Risk factor(s) 1). Estrogen containing birth control use 2). Probable protein C deficiency (level 66%)  Treatment: started on heparin and transferred to tertiary care. Cardiac U/S showed dilated R ventricle with septal bowing, EKG with S1Q3T3 pattern, R BBB, and TWI in inferior leads. Bilateral DVT U/S was negative. TPA was administered and she was admitted to the MICU on a heparin drip    2. Protein C deficiency. Value 66% (normal %) in 2021. Repeat values with 52% and 54%. Genetic testing negative for known mutation.     3. Elevated Ddimer on anticoagulation: value 630 in 2021 on blood thinner  4. Non-anemic iron deficiency: ferritin 78%  5. Chronic anticoagulation    Ada had a provoked thrombosis in setting of being on estrogen, however agree with collegues at [facility] [place] that time (>1.5 y after starting) and severity of her episode warranted additional workup. Her evalutaion is notable for negative antiphospholipid antibodies, but low protein C of 66% that was obtained in 2021. This was confirmed with levels as low as 52% in 2021. Roberto met with genetics, and sequencing did not identify any known mutations in protein C, which is not uncommon.     However, given her persistently low protein C levels, along with her D-dimer elevation on anticoagulation, I would recommend that Ada remain on prophylactic anticoagulation. Especially as she is tolerating  Xarelto without issues.     I did address with Ada that in the future, her personal history of blood clot and potential protein C deficiency will have implications for pregnancy and pregnancy planning. Would anticipate that she will need prophylaxis dose LMWH in the peripartum period and full dose anticoagulation post-partum. Would also likely need at least 1 maternal fetal medicine consult.     Plan:  1. Majority of today's visit was spent counseling the patient regarding provoked PE and potential Protein C Deficiency.  2. continue Xarelto 10 mg PO daily  3. Needs annual CBC and metabolic panel in May-June 2022  4. Consider IV iron in ferritin <50 : can be done while patient is on summer break from job    The patient is given our center's contact information and is instructed to call if she should have any further questions or concerns.  Otherwise, we will plan on seeing her back Follow up with Provider - 12 months or sooner .      Amanda Melgar is the nurse clinician assigned to coordinate patient care and education.     Patient understands and agrees with the above plan and recommendation.    Faiza Regan MD/PhD   of Medicine  Division of Hematology, Oncology and Transplantation    9:11 AM    ---------------------------------------------------------------------------------------------------------------------    Interval :  Ada Shelby is a 26 year old woman referred for recent pulmonary embolism.     Ada is back to training for a marathon. Has not noted any increased SOB or decreased tolerance of exercise. Energy levels are good.     Currently has an IUD-- has rare spotting.     No leg swelling.     Past Medical History:  Active Ambulatory Problems     Diagnosis Date Noted     Iron deficiency 04/07/2021     Encounter for IUD insertion 01/06/2021     History of pulmonary embolus (PE) 12/30/2020     Iron deficiency anemia secondary to inadequate dietary iron intake 12/29/2020      PE (pulmonary thromboembolism) (H) 2020     Resolved Ambulatory Problems     Diagnosis Date Noted     No Resolved Ambulatory Problems     Past Medical History:   Diagnosis Date     History of amenorrhea      Iron deficiency anemia      Past Surgical History:  No past surgical history on file.    Medications:  Current Outpatient Medications   Medication Sig Dispense Refill     ferrous sulfate (FEROSUL) 325 (65 Fe) MG tablet Take 325 mg by mouth (Patient not taking: Reported on 2021)       levonorgestrel (RITESH) 13.5 MG IUD 1 Intra Uterine Device by Intrauterine route       rivaroxaban ANTICOAGULANT (XARELTO ANTICOAGULANT) 20 MG TABS tablet Take 1 tablet (20 mg) by mouth daily with food 30 tablet 2     Rivaroxaban ANTICOAGULANT 15 & 20 MG TBPK Take 15 mg by mouth twice daily with food for first 21 days, then on day 22 start taking 20 mg daily with food until told to stop by your provider.          Allergies:  No Known Allergies    ROS:  Denies any bleeding issues. No gum bleeding, No nose bleed. Denies any recent  hematuria or blood in stools. Denies any ecchymosis. Denies any lower extremities swelling or pain. Denies any fever, no chest pain. Denies any shortness of breath. Remainder of 10 point ROS is negative unless noted above    Social History:  No tobacco use. rare alcohol use. Denies any illicit drug use. Patient works as a  in [place], [place]. Grew in [place], [place]. Attended [place], [place].  and lives in [place], [place] with . Just got a kitten.     Family History:  1 younger sister  Mother- alive- had surgery for fibromuscular dysplasia 15 years ago. No history of miscarriage  Maternal grandfather  from suicide  Maternal grandmother: alive. In good health  Father- alive. No history of surgeries  Paternal aunt- unaware of miscarriages  Paternal uncle-  Paternal grandfather- . Passed away in hospital from complications of pneumonia- potential cancer in his  "80s  Paternal grandmother- alive    Objectives:  BP (!) 148/82   Pulse 56   Temp 98.2  F (36.8  C) (Oral)   Resp 16   Ht 1.702 m (5' 7\")   Wt 55.7 kg (122 lb 11.2 oz)   SpO2 100%   BMI 19.22 kg/m      GENERAL: Healthy, alert and no distress  EYES: Eyes grossly normal to inspection.  No discharge or erythema, or obvious scleral/conjunctival abnormalities.  RESP: No audible wheeze, cough, or visible cyanosis.  No visible retractions or increased work of breathing.    SKIN: Visible skin clear. No significant rash, abnormal pigmentation or lesions.  NEURO: Cranial nerves grossly intact.  Mentation and speech appropriate for age.  PSYCH: Mentation appears normal, affect normal/bright, judgement and insight intact, normal speech and appearance well-groomed.      Labs:  Reviewed in CareEverywhere  Negative genetic evaluation for factor V leiden, prothrombin mutaiton  Normal anticardiolipin and beta2 glycoprotein x2  Negative lupus anticoagulant x1  Normal AT3, protein S  Prot C Chromogenic   Date Value Ref Range Status   05/14/2021 54 (L) 70 - 170 % Final     Comment:     This patient has a decreased Protein C activity indicating congenital or   acquired Protein C deficiency. Acquired causes of Protein C deficiency include   vitamin K antagonist therapy (e.g. warfarin), vitamin K deficiency, acute   thrombotic event, liver disease, sepsis, disseminated intravascular   coagulation, and L-asparaginase therapy.  If the patient is on a vitamin K antagonist, recommend repeat testing after   the drug has been discontinued for at least 14 days and the International   Normalized Ratio (INR) has normalized.       Protein C Chromogenic   Date Value Ref Range Status   07/12/2021 52 (L) 70 - 170 % Final     Comment:     This patient has a decreased Protein C activity indicating  congenital or acquired Protein C deficiency. Acquired causes of Protein C   deficiency include vitamin K antagonist therapy (e.g. warfarin), vitamin K "   deficiency, acute thrombotic event, liver disease, sepsis, disseminated intravascular  coagulation, and L-asparaginase therapy.  If the patient is on a vitamin K antagonist, recommend  repeat testing after the drug has been discontinued for at least 14 days and the  International Normalized Ratio (INR) has normalized.           Imaging:  None in interval

## 2022-03-27 ENCOUNTER — HEALTH MAINTENANCE LETTER (OUTPATIENT)
Age: 27
End: 2022-03-27

## 2022-09-25 ENCOUNTER — HEALTH MAINTENANCE LETTER (OUTPATIENT)
Age: 27
End: 2022-09-25

## 2023-03-31 DIAGNOSIS — D68.59 PROTEIN C DEFICIENCY (H): ICD-10-CM

## 2023-04-03 DIAGNOSIS — D68.59 PROTEIN C DEFICIENCY (H): Primary | ICD-10-CM

## 2023-04-03 DIAGNOSIS — I26.99 PE (PULMONARY THROMBOEMBOLISM) (H): ICD-10-CM

## 2023-04-03 DIAGNOSIS — E61.1 IRON DEFICIENCY: ICD-10-CM

## 2023-04-03 DIAGNOSIS — Z79.01 CHRONIC ANTICOAGULATION: ICD-10-CM

## 2023-05-03 ENCOUNTER — OFFICE VISIT (OUTPATIENT)
Dept: HEMATOLOGY | Facility: CLINIC | Age: 28
End: 2023-05-03
Attending: INTERNAL MEDICINE
Payer: COMMERCIAL

## 2023-05-03 VITALS
OXYGEN SATURATION: 100 % | DIASTOLIC BLOOD PRESSURE: 81 MMHG | BODY MASS INDEX: 19.5 KG/M2 | TEMPERATURE: 97.5 F | WEIGHT: 124.5 LBS | SYSTOLIC BLOOD PRESSURE: 147 MMHG | HEART RATE: 64 BPM

## 2023-05-03 DIAGNOSIS — Z79.01 CHRONIC ANTICOAGULATION: ICD-10-CM

## 2023-05-03 DIAGNOSIS — D68.59 PROTEIN C DEFICIENCY (H): Primary | ICD-10-CM

## 2023-05-03 PROCEDURE — 99214 OFFICE O/P EST MOD 30 MIN: CPT | Performed by: INTERNAL MEDICINE

## 2023-05-03 PROCEDURE — G0463 HOSPITAL OUTPT CLINIC VISIT: HCPCS | Performed by: INTERNAL MEDICINE

## 2023-05-03 NOTE — PROGRESS NOTES
[facility]  [facility] [place] [address]  Phone: [phone], Fax: [phone]    Outpatient Visit Note:    Patient: Ada Shelby  MRN: [id number]  : 1995  USAMA: May 3, 2023    Reason for Consultation:  Provoked PE with protein C deficiency    Assessment:  In summary, Ada Shelby is a 28 year old woman with past medical history of iron deficiency anemia who presents for follow-up of provoked PE in 2020.     1. Provoked Pulmonary embolism. ddx 2020. Risk factor(s) 1). Estrogen containing birth control use 2). Protein C deficiency (see below)  Treatment: started on heparin and transferred to tertiary care. Cardiac U/S showed dilated R ventricle with septal bowing, EKG with S1Q3T3 pattern, R BBB, and TWI in inferior leads. Bilateral DVT U/S was negative. TPA was administered and she was admitted to the MICU on a heparin drip    2. Protein C deficiency. Value 66% (normal %) in 2021. Repeat values with 52% and 54%. Genetic testing negative for known mutation.     3. Elevated D-dimer on anticoagulation: value 630 in 2021 on blood thinner  4. Non-anemic iron deficiency: ferritin 38.5%  5. Chronic anticoagulation    Ada had a provoked thrombosis in setting of being on estrogen, however agree with [place] [facility] that [place] (>1.5 y after initiating OCP) and severity of her episode warranted additional workup. Her evaluation is notable for negative antiphospholipid antibodies, but low protein C of 66% that was obtained in 2021. This was confirmed with protein C levels of 52% in 2021 and 54% in May 2021. Roberto met with genetics, and sequencing did not identify any known mutations in protein C, which is not uncommon.     However, given her persistently low protein C levels, along with her D-dimer elevation on anticoagulation, I would recommend that Ada remain on prophylactic anticoagulation. Especially as she  is tolerating Xarelto without issues.     I did address with Ada that in the future, her personal history of blood clot and protein C deficiency will have implications for pregnancy and pregnancy planning. Would anticipate that she will need prophylaxis dose LMWH in the peripartum period and full dose anticoagulation for 6 weeks post-partum (continuing with prophylaxis dose LMWH while breastfeeding). Would also likely need at least 1 maternal fetal medicine consult after positive pregnancy test. When her IUD is removed and she and her  are ready to start trying to conceive, she will contact us for a prescription for LMWH.     Plan:  1. Majority of today's visit was spent counseling the patient regarding provoked PE and protein C deficiency.  2. Continue Xarelto 20 mg PO daily  3. Continue with oral iron supplement.  4. Needs annual CBC and metabolic panel in summer 2024.      The patient is given our center's contact information and is instructed to call if she should have any further questions or concerns.  Otherwise, we will plan on seeing her back - around August 2024 .      Amanda Melgar is the nurse clinician assigned to coordinate patient care and education.     Patient understands and agrees with the above plan and recommendation.      Yasmine Sepulveda, MS3    Physician Attestation   I, Faiaz Regan MD,/PhD was present with the medical/LIZANDRO student who participated in the service and in the documentation of the note.  I have verified the history and personally performed the physical exam and medical decision making.  I agree with the assessment and plan of care as documented in the note.      Items personally reviewed: vitals, labs and evaluated patient and agree with the interpretation documented in the note.    Faiza Regan MD/PhD   of Medicine  Division of Hematology, Oncology and Transplantation    11:27  AM    ---------------------------------------------------------------------------------------------------------------------    Interval :  Ada Shelby is a 28 year old woman with a history significant for pulmonary embolism in November 2020 and now on chronic anticoagulation.     Ada is taking Xarelto 20 mg daily. She takes the medication in the morning, with food. If she runs in the morning, she takes the medication after a run. Otherwise she runs in the afternoon after work.     She is tolerating the Xarelto well. No concerns with bruising, gum bleeding, or nose bleeds.    She had one fall while running this winter but only injury was a scrape.     No shortness of breath. Energy levels are good. No leg swelling.     Currently has an IUD-has a light period every other month.     Her summer plans include teaching summer school and a trip to [place] for several weeks, where she will be volunteering at a children's home.     Past Medical History:  Active Ambulatory Problems     Diagnosis Date Noted     Iron deficiency 04/07/2021     Encounter for IUD insertion 01/06/2021     History of pulmonary embolus (PE) 12/30/2020     Iron deficiency anemia secondary to inadequate dietary iron intake 12/29/2020     PE (pulmonary thromboembolism) (H) 11/28/2020     Resolved Ambulatory Problems     Diagnosis Date Noted     No Resolved Ambulatory Problems     Past Medical History:   Diagnosis Date     History of amenorrhea      Iron deficiency anemia      Past Surgical History:  No past surgical history on file.    Medications:  Current Outpatient Medications   Medication Sig Dispense Refill     ferrous sulfate (FEROSUL) 325 (65 Fe) MG tablet Take 325 mg by mouth        levonorgestrel (RITESH) 13.5 MG IUD 1 Intra Uterine Device by Intrauterine route       rivaroxaban ANTICOAGULANT (XARELTO ANTICOAGULANT) 20 MG TABS tablet Take 1 tablet (20 mg) by mouth daily with food 30 tablet 1        Allergies:  No Known  Allergies    ROS:  Denies any bleeding issues. No gum bleeding, No nose bleed. Denies any recent  hematuria or blood in stools. Denies any ecchymosis. Denies any lower extremities swelling or pain. Denies any fever, no chest pain. Denies any shortness of breath. Remainder of 10 point ROS is negative unless noted above.    Social History:  No tobacco use. Rare alcohol use. Denies any illicit drug use. Patient works as a  in [place], [place]. Grew in [place], [place]. Attended [place], [place].  and lives in [place], [place] with .     Family History:  1 younger sister  Mother- alive- had surgery for fibromuscular dysplasia 15 years ago. No history of miscarriage  Maternal grandfather  from suicide  Maternal grandmother: alive. In good health  Father- alive. No history of surgeries  Paternal aunt- unaware of miscarriages  Paternal uncle-  Paternal grandfather- . Passed away in hospital from complications of pneumonia- potential cancer in his 80s  Paternal grandmother- alive    Objectives:  BP (!) 147/81 (BP Location: Right arm, Patient Position: Sitting, Cuff Size: Adult Regular)   Pulse 64   Temp 97.5  F (36.4  C) (Oral)   Wt 56.5 kg (124 lb 8 oz)   SpO2 100%   BMI 19.50 kg/m      GENERAL: Healthy, alert and no distress  EYES: Eyes grossly normal to inspection.  No discharge or erythema, or obvious scleral/conjunctival abnormalities.  RESP: No audible wheeze, cough, or visible cyanosis.  No visible retractions or increased work of breathing.    SKIN: Visible skin clear. No significant rash, abnormal pigmentation or lesions.  NEURO: Cranial nerves grossly intact.  Mentation and speech appropriate for age.  PSYCH: Mentation appears normal, affect normal/bright, judgement and insight intact, normal speech and appearance well-groomed.      Labs:  Reviewed in Care Everywhere from 2023  Hemoglobin 15.3  Ferritin of 38.5    Negative genetic evaluation for factor V leiden, prothrombin  mutaiton  Normal anticardiolipin and beta2 glycoprotein x2  Negative lupus anticoagulant x1  Normal AT3, protein S  Prot C Chromogenic   Date Value Ref Range Status   05/14/2021 54 (L) 70 - 170 % Final     Comment:     This patient has a decreased Protein C activity indicating congenital or   acquired Protein C deficiency. Acquired causes of Protein C deficiency include   vitamin K antagonist therapy (e.g. warfarin), vitamin K deficiency, acute   thrombotic event, liver disease, sepsis, disseminated intravascular   coagulation, and L-asparaginase therapy.  If the patient is on a vitamin K antagonist, recommend repeat testing after   the drug has been discontinued for at least 14 days and the International   Normalized Ratio (INR) has normalized.       Protein C Chromogenic   Date Value Ref Range Status   07/12/2021 52 (L) 70 - 170 % Final     Comment:     This patient has a decreased Protein C activity indicating  congenital or acquired Protein C deficiency. Acquired causes of Protein C   deficiency include vitamin K antagonist therapy (e.g. warfarin), vitamin K   deficiency, acute thrombotic event, liver disease, sepsis, disseminated intravascular  coagulation, and L-asparaginase therapy.  If the patient is on a vitamin K antagonist, recommend  repeat testing after the drug has been discontinued for at least 14 days and the  International Normalized Ratio (INR) has normalized.       Imaging:  None in interval

## 2023-05-08 ENCOUNTER — HEALTH MAINTENANCE LETTER (OUTPATIENT)
Age: 28
End: 2023-05-08

## 2024-05-08 ENCOUNTER — TELEPHONE (OUTPATIENT)
Dept: HEMATOLOGY | Facility: CLINIC | Age: 29
End: 2024-05-08
Payer: COMMERCIAL

## 2024-05-11 ENCOUNTER — HEALTH MAINTENANCE LETTER (OUTPATIENT)
Age: 29
End: 2024-05-11

## 2024-06-18 DIAGNOSIS — D68.59 PROTEIN C DEFICIENCY (H): ICD-10-CM

## 2024-06-18 DIAGNOSIS — Z79.01 CHRONIC ANTICOAGULATION: ICD-10-CM

## 2024-09-04 ENCOUNTER — VIRTUAL VISIT (OUTPATIENT)
Dept: HEMATOLOGY | Facility: CLINIC | Age: 29
End: 2024-09-04
Attending: INTERNAL MEDICINE
Payer: COMMERCIAL

## 2024-09-04 VITALS — WEIGHT: 125 LBS | BODY MASS INDEX: 19.58 KG/M2

## 2024-09-04 DIAGNOSIS — Z79.01 CHRONIC ANTICOAGULATION: ICD-10-CM

## 2024-09-04 DIAGNOSIS — D68.59 PROTEIN C DEFICIENCY (H): ICD-10-CM

## 2024-09-04 PROCEDURE — 99213 OFFICE O/P EST LOW 20 MIN: CPT | Mod: 95 | Performed by: INTERNAL MEDICINE

## 2024-09-04 NOTE — NURSING NOTE
Patient was contacted to complete the pre-visit call prior to their telephone visit with the provider.     Allergies and medications were reviewed.     I thanked them for their time to cover this information.     Ximena Arzola, ZOILA

## 2024-09-04 NOTE — PROGRESS NOTES
[facility]  [facility] [place] [address]  Phone: [phone], Fax: [phone]    Outpatient Visit Note:    Patient: Ada Shelby  MRN: [id number]  : 1995  USAMA: Sep 4, 2024    Reason for Consultation:  Provoked PE with protein C deficiency    Assessment:  In summary, Ada Shelby is a 29 year old woman with past medical history of iron deficiency anemia who presents for follow-up of provoked PE in 2020.     1. Provoked Pulmonary embolism. ddx 2020. Risk factor(s) 1). Estrogen containing birth control use 2). Protein C deficiency (see below)  Treatment: started on heparin and transferred to tertiary care. Cardiac U/S showed dilated R ventricle with septal bowing, EKG with S1Q3T3 pattern, R BBB, and TWI in inferior leads. Bilateral DVT U/S was negative. TPA was administered and she was admitted to the MICU on a heparin drip    2. Protein C deficiency. Value 66% (normal %) in 2021. Repeat values with 52% and 54%. Genetic testing negative for known mutation.     3. Elevated D-dimer on anticoagulation: value 630 in 2021 on blood thinner  4. Non-anemic iron deficiency: ferritin 38.5%  5. Chronic anticoagulation    Ada had a provoked thrombosis in setting of being on estrogen, however agree with collegues [place] [facility] [place] that time (>1.5 y after initiating OCP) and severity of her episode warranted additional workup. Her evaluation is notable for negative antiphospholipid antibodies, but low protein C of 66% that was obtained in 2021. This was confirmed with protein C levels of 52% in 2021 and 54% in May 2021. Roberto met with genetics, and sequencing did not identify any known mutations in protein C, which is not uncommon.     However, given her persistently low protein C levels, along with her D-dimer elevation on anticoagulation, I would recommend that Ada remain on prophylactic anticoagulation. Especially as she  is tolerating Xarelto without issues.     I did address with Ada that in the future, her personal history of blood clot and protein C deficiency will have implications for pregnancy and pregnancy planning. Would anticipate that she will need prophylaxis dose LMWH in the peripartum period and full dose anticoagulation for 6 weeks post-partum (continuing with prophylaxis dose LMWH while breastfeeding). Would also likely need at least 1 maternal fetal medicine consult after positive pregnancy test. When her IUD is removed and she and her  are ready to start trying to conceive, she will contact us for a prescription for LMWH.     Plan:  1. Majority of today's visit was spent counseling the patient regarding provoked PE and protein C deficiency.  2. Continue Xarelto 20 mg PO daily  3. Continue with oral iron supplement.  4. Needs annual CBC and metabolic panel in summer 2025--- recommend addition of ferritin and iron studies      The patient is given our center's contact information and is instructed to call if she should have any further questions or concerns.  Otherwise, we will plan on seeing her back - around August 2025 .      Laureano Dangelo is the nurse clinician assigned to coordinate patient care and education.       Faiza Regan MD/PhD   of Medicine  Division of Hematology, Oncology and Transplantation    9:45 AM    ---------------------------------------------------------------------------------------------------------------------    Interval :  Ada Shelby is a 29 year old woman with a history significant for pulmonary embolism in November 2020 and now on chronic anticoagulation.     Ada is taking Xarelto 20 mg daily. She takes the medication in the morning, with food. If she runs in the morning, she takes the medication after a run. Otherwise she runs in the afternoon after work.     She is tolerating the Xarelto well. No concerns with bruising, gum bleeding, or nose  bleeds.    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   First day tomorrow.     No leg swelling, heaviness or cramps. Continues to run 40-45 miles a week. Ran a marathon in the spring.     No spotting with IUD--- new one in January     Past Medical History:  Active Ambulatory Problems     Diagnosis Date Noted    Iron deficiency 04/07/2021    Encounter for IUD insertion 01/06/2021    History of pulmonary embolus (PE) 12/30/2020    Iron deficiency anemia secondary to inadequate dietary iron intake 12/29/2020    PE (pulmonary thromboembolism) (H) 11/28/2020     Resolved Ambulatory Problems     Diagnosis Date Noted    No Resolved Ambulatory Problems     Past Medical History:   Diagnosis Date    History of amenorrhea     Iron deficiency anemia      Past Surgical History:  No past surgical history on file.    Medications:  Current Outpatient Medications   Medication Sig Dispense Refill    ferrous sulfate (FEROSUL) 325 (65 Fe) MG tablet Take 325 mg by mouth       rivaroxaban ANTICOAGULANT (XARELTO ANTICOAGULANT) 20 MG TABS tablet Take 1 tablet (20 mg) by mouth daily with food 30 tablet 1    rivaroxaban ANTICOAGULANT (XARELTO) 20 MG TABS tablet Take 1 tablet (20 mg) by mouth daily (with dinner) 90 tablet 3    levonorgestrel (RITESH) 13.5 MG IUD 1 Intra Uterine Device by Intrauterine route          Allergies:  No Known Allergies    ROS:  Denies any bleeding issues. No gum bleeding, No nose bleed. Denies any recent  hematuria or blood in stools. Denies any ecchymosis. Denies any lower extremities swelling or pain. Denies any fever, no chest pain. Denies any shortness of breath. Remainder of 10 point ROS is negative unless noted above.    Social History:  No tobacco use. Rare alcohol use. Denies any illicit drug use. Patient works as a  in [place], [place]. Grew in  [place]. Attended [place], [place].  and lives in [place], [place] with .     Family History:  1 younger sister  Mother- alive- had surgery for fibromuscular dysplasia 15 years ago. No history of miscarriage  Maternal grandfather  from suicide  Maternal grandmother: alive. In good health  Father- alive. No history of surgeries  Paternal aunt- unaware of miscarriages  Paternal uncle-  Paternal grandfather- . Passed away in hospital from complications of pneumonia- potential cancer in his 80s  Paternal grandmother- alive    Objectives:  Wt 56.7 kg (125 lb)   BMI 19.58 kg/m      GENERAL: Healthy, alert and no distress  EYES: Eyes grossly normal to inspection.  No discharge or erythema, or obvious scleral/conjunctival abnormalities.  RESP: No audible wheeze, cough, or visible cyanosis.  No visible retractions or increased work of breathing.    SKIN: Visible skin clear. No significant rash, abnormal pigmentation or lesions.  NEURO: Cranial nerves grossly intact.  Mentation and speech appropriate for age.  PSYCH: Mentation appears normal, affect normal/bright, judgement and insight intact, normal speech and appearance well-groomed.      Labs:  Reviewed in Care Everywhere     Negative genetic evaluation for factor V leiden, prothrombin mutaiton  Normal anticardiolipin and beta2 glycoprotein x2  Negative lupus anticoagulant x1  Normal AT3, protein S  Prot C Chromogenic   Date Value Ref Range Status   2021 54 (L) 70 - 170 % Final     Comment:     This patient has a decreased Protein C activity indicating congenital or   acquired Protein C deficiency. Acquired causes of Protein C deficiency include   vitamin K antagonist therapy (e.g. warfarin), vitamin K deficiency, acute   thrombotic event, liver disease, sepsis, disseminated intravascular   coagulation, and L-asparaginase therapy.  If the patient is on a vitamin K antagonist, recommend repeat testing after   the drug has been discontinued  for at least 14 days and the International   Normalized Ratio (INR) has normalized.       Protein C Chromogenic   Date Value Ref Range Status   07/12/2021 52 (L) 70 - 170 % Final     Comment:     This patient has a decreased Protein C activity indicating  congenital or acquired Protein C deficiency. Acquired causes of Protein C   deficiency include vitamin K antagonist therapy (e.g. warfarin), vitamin K   deficiency, acute thrombotic event, liver disease, sepsis, disseminated intravascular  coagulation, and L-asparaginase therapy.  If the patient is on a vitamin K antagonist, recommend  repeat testing after the drug has been discontinued for at least 14 days and the  International Normalized Ratio (INR) has normalized.       Imaging:  None in interval

## 2025-05-17 ENCOUNTER — HEALTH MAINTENANCE LETTER (OUTPATIENT)
Age: 30
End: 2025-05-17

## 2025-05-20 ENCOUNTER — DOCUMENTATION ONLY (OUTPATIENT)
Dept: ANTICOAGULATION | Facility: CLINIC | Age: 30
End: 2025-05-20
Payer: COMMERCIAL

## 2025-05-20 NOTE — PROGRESS NOTES
Anticoagulant Therapeutic Duplication    Duplicate orders identified: identical order(s)    The duplicate anticoagulant order(s) has been discontinued    Active anticoagulant: rivaroxaban (Xarelto)    Plan made per [facility] anticoagulation protocol.    Preston Haque RN  5/20/2025